# Patient Record
(demographics unavailable — no encounter records)

---

## 2017-07-02 NOTE — PDOC
History of Present Illness





- General


History Source: Patient, Old Records


Exam Limitations: No Limitations





- History of Present Illness


Initial Comments: 


07/02/17 09:51


The patient is a 59 year old male with a past medical history of COPD (on home 

O2 2L NC), asthma, CAD s/p PCI and stents s/p CABG in 2014, HTN, HLD who 

presents to the emergency department today with shortness of breath and left 

suprapubic pain for 2 days. The patient states that his pain was manageable 

when first presenting but has since become worsening. He notes that his pain is 

exacerbated by movement. He had one episode on non-bilious non-bloody vomit 

yesterday. 


 


PCP: Dr. Whitaker [phone]


 


PAST MEDICAL HISTORY:  COPD, asthma, CAD, HTN, HLD


PAST SURGICAL HISTORY:  PCI and stents s/p CABG in 2014, 


SOCIAL HISTORY: Former smoker (quit 2014). Denies EtOH use since 1979


ALLERGIES:  Penicillins, Seafood








<Andrea Chong - Last Filed: 07/02/17 12:24>





- General


History Source: Patient, Old Records


Exam Limitations: No Limitations





<Marcia Oakes - Last Filed: 07/02/17 14:04>





- General


Chief Complaint: Pain, Acute


Stated Complaint: PELVIC PAIN


Time Seen by Provider: 07/02/17 09:14





Past History





<Andrea Chong - Last Filed: 07/02/17 12:24>





- Past Medical History


Anemia: No


Asthma: No


Cancer: No


Cardiac Disorders: Yes (MI, stent, BYPASS)


CVA: No


COPD: Yes


CHF: Yes


Dementia: No


Diabetes: No


GI Disorders: No


 Disorders: No


HTN: Yes


Hypercholesterolemia: Yes


Liver Disease: No


Psychiatric Problems: Yes (anxiety)


Seizures: No


Thyroid Disease: No





- Surgical History


Abdominal Surgery: No


Appendectomy: No


Cardiac Surgery: Yes (Stent, cardiaccath)


Cholecystectomy: No


Lung Surgery: No


Neurologic Surgery: No


Orthopedic Surgery: Yes (KNEE)





- Immunization History


Immunization Up to Date: Yes





- Psycho/Social/Smoking Cessation Hx


Anxiety: No


Suicidal Ideation: No


Smoking Status: No


Smoking History: Former smoker


Have you smoked in the past 12 months: No


Number of Cigarettes Smoked Daily: 0


If you are a former smoker, when did you quit?: 2 years


Information on smoking cessation initiated: No


Hx Alcohol Use: No (1979)


Drug/Substance Use Hx: No


Substance Use Type: None


Hx Substance Use Treatment: No





<Marcia Oakes - Last Filed: 07/02/17 14:04>





- Past Medical History


Allergies/Adverse Reactions: 


 Allergies











Allergy/AdvReac Type Severity Reaction Status Date / Time


 


Penicillins Allergy Severe Rash Verified 02/28/17 15:32


 


seafood Allergy Severe Rash Uncoded 02/28/17 15:32











Home Medications: 


Ambulatory Orders





Albuterol 2.5/Ipratropium 0.5 [Duoneb -] 1 amp NEB QIDR  amp 11/30/16 


Budesonide/Formeterol Fumarate [SYMBICORT 80/4.5mcg -] 1 puff IH BID  inhaler 11 /30/16 


Clopidogrel Bisulfate [Plavix -] 75 mg PO DAILY  tablet 11/30/16 


Furosemide [Lasix -] 40 mg PO DAILY  tablet 11/30/16 


Prednisone [Deltasone -] 10 mg PO DAILY 02/28/17 


Clindamycin [Cleocin -] 300 mg PO TID #21 capsule 07/02/17 


Ramipril 5 mg PO DAILY 07/02/17 


Simvastatin 40 mg PO DAILY 07/02/17 











**Review of Systems





- Review of Systems


Able to Perform ROS?: Yes


Comments:: 


07/02/17 09:51


CONSTITUTIONAL: 


Absent: fever, chills, diaphoresis, generalized weakness, malaise, loss of 

appetite


HEENT: 


Absent: rhinorrhea, nasal congestion, throat pain, throat swelling, difficulty 

swallowing, mouth swelling, ear pain, eye pain, visual Changes


CARDIOVASCULAR: 


Absent: chest pain, syncope, palpitations, irregular heart rate, lightheadedness

, peripheral edema


RESPIRATORY: 


Present: Shortness of breath


Absent: cough, dyspnea with exertion, orthopnea, wheezing, stridor, hemoptysis


GASTROINTESTINAL:


Present: vomiting, left suprabupic pain 


Absent: abdominal distension, nausea, diarrhea, constipation, melena, 

hematochezia


GENITOURINARY: 


Absent: dysuria, frequency, urgency, hesitancy, hematuria, flank pain, genital 

pain


MUSCULOSKELETAL: 


Absent: myalgia, arthralgia, joint swelling


SKIN: 


Absent: rash, itching, pallor


HEMATOLOGIC/IMMUNOLOGIC: 


Absent: easy bleeding, easy bruising, lymphadenopathy, frequent infections


ENDOCRINE:


Absent: unexplained weight gain, unexplained weight loss, heat intolerance, 

cold intolerance


NEUROLOGIC: 


Absent: headache, focal weakness or paresthesias, dizziness, unsteady gait, 

seizure, mental status changes, bladder or bowel incontinence


PSYCHIATRIC: 


Absent: anxiety, depression, suicidal or homicidal ideation, hallucinations.








<Andrea Chong - Last Filed: 07/02/17 12:24>





*Physical Exam





- Vital Signs


 Last Vital Signs











Temp Pulse Resp BP Pulse Ox


 


 97.5 F L  109 H  22   125/87   99 


 


 07/02/17 08:50  07/02/17 08:50  07/02/17 08:50  07/02/17 08:50  07/02/17 08:50














- Physical Exam


Comments: 


07/02/17 09:51


GENERAL:


Well developed, well nourished. Awake and alert. In no acute distress.


HEENT:


Normocephalic, atraumatic. PERRLA, EOMI. No conjunctival pallor. Sclera are non-

icteric. Moist mucous membranes. Oropharynx is clear.


NECK: Supple. Full ROM. No JVD. Carotid pulses 2+ and symmetric, without 

bruits. No thyromegaly. No lymphadenopathy.


CARDIOVASCULAR:


Regular rate and rhythm. No murmurs, rubs, or gallops. Distal pulses are 2+ and 

symmetric. 


PULMONARY: 


(+) No evidence of respiratory distress. Lungs clear to auscultation 

bilaterally. No wheezing, rales or rhonchi. Breath sounds are discount but clear


ABDOMINAL:


(+) Soft. LLQ and left inguinal tenderness no inguinal hernias, penis is 

circumcised. Non-distended. No rebound or guarding. No organomegaly. 

Normoactive bowel sounds. 


MUSCULOSKELETAL 


Normal range of motion at all joints. No bony deformities or tenderness. No CVA 

tenderness.


EXTREMITIES: 


No cyanosis. No clubbing. No edema. No calf tenderness.


SKIN: Warm and dry. Normal capillary refill. No rashes. No jaundice. 


NEUROLOGICAL: Alert, awake, appropriate. Cranial nerves 2-12 intact. No 

deficits to light touch and temperature in face, upper extremities and lower 

extremities. No motor deficits in the in face, upper extremities and lower 

extremities. Normoreflexic in the upper and lower extremities. Normal speech. 

Toes are downgoing bilaterally. Gait is normal without ataxia.


PSYCHIATRIC: 


Cooperative. Good eye contact. Appropriate mood and affect.





<Andrea Chong - Last Filed: 07/02/17 12:24>





- Vital Signs


 Last Vital Signs











Temp Pulse Resp BP Pulse Ox


 


 97.5 F L  109 H  22   125/87   99 


 


 07/02/17 08:50  07/02/17 08:50  07/02/17 08:50  07/02/17 08:50  07/02/17 08:50














<Marcia Oakes - Last Filed: 07/02/17 14:04>





ED Treatment Course





- LABORATORY


CBC & Chemistry Diagram: 


 07/02/17 09:25





 07/02/17 09:25





- ADDITIONAL ORDERS


Additional order review: 


 Laboratory  Results











  07/02/17





  09:25


 


Phosphorus  Cancelled














- RADIOLOGY


Radiograph Interpretation: 


07/02/17 10:52


EXAM#: TYPE/EXAM: RESULT: [id number] CT/ABDOMEN PELVIS CT W/O CONTR ***ADDENDUM**

* ******** ADDENDUM #1 ******** Addendum: Centrilobular emphysema is seen 

involving the partially imaged lower chest.******** ORIGINAL REPORT ******** 

Abdomen and pelvis CT (without contrast) Clinical information: left lower 

quadrant pain Multiplanar imaging was performed. As requested no intravenous or 

oral contrast was administered. Diffuse colonic diverticulosis is noted without 

evidence of acute diverticulitis. Mild to moderate prostate enlargement. Small 

left inguinal hernia containing fat only. The appendix appears unremarkable. 

Colonic fecal retention which is probably moderate. No evidence of 

pneumoperitoneum, free intraperitoneal fluid or bowel obstruction. A right 

flank hernia is noted into which the descending colon bulges. No left-sided 

flank hernia is seen. There is equivocal subtle hepatic surface irregularity 

along the right lobe inferiorly. Clinical/ laboratory correlation is suggested 

in regards to possible cirrhosis. The spleen, pancreas, gallbladder, and 

adrenal glands and kidneys demonstrate no discrete noncontrast pathology. There 

is mild atherosclerotic dilatation of the infrarenal aorta. Somewhat prominent 

atherosclerotic aortic wall calcifications are noted. No definite 

lymphadenopathy is seen. Impression: No definite CT findings of acute pathology 

are identified. Colonic diverticulosis. Right flank hernia with the ascending 

colon bulging into the hernia defect. Small left inguinal hernia containing fat 

only. Equivocal subtle CT evidence of hepatic cirrhosis. atherosclerotic aortic 

wall calcifications are noted which may be somewhat more prominent than would 

be expected for the patient's chronologic age. Mild L1 superior endplate 

compression fracture which appears chronic. No bony retropulsion is seen. 

Reported By: Troy Avalos MD 07/02/17 1042 Abdomen and pelvis CT (without 

contrast) Clinical information: left lower quadrant pain Multiplanar imaging 

was performed. As requested no intravenous or oral contrast was administered. 

Diffuse colonic diverticulosis is noted without evidence of acute 

diverticulitis. Mild to moderate prostate enlargement. Small left inguinal 

hernia containing fat only. The appendix appears unremarkable. Colonic fecal 

retention which is probably moderate. No evidence of pneumoperitoneum, free 

intraperitoneal fluid or bowel obstruction. A right flank hernia is noted into 

which the descending colon bulges. No left-sided flank hernia is seen. There is 

equivocal subtle hepatic surface irregularity along the right lobe inferiorly. 

Clinical/ laboratory correlation is suggested in regards to possible cirrhosis. 

The spleen, pancreas, gallbladder, and adrenal glands and kidneys demonstrate 

no discrete noncontrast pathology. There is mild atherosclerotic dilatation of 

the infrarenal aorta. Somewhat prominent atherosclerotic aortic wall 

calcifications are noted. No definite lymphadenopathy is seen. Impression: No 

definite CT findings of acute pathology are identified. Colonic diverticulosis. 

Right flank hernia with the ascending colon bulging into the hernia defect. 

Small left inguinal hernia containing fat only. Equivocal subtle CT evidence of 

hepatic cirrhosis. atherosclerotic aortic wall calcifications are noted which 

may be somewhat more prominent than would be expected for the patient's 

chronologic age. Mild L1 superior endplate compression fracture which appears 

chronic. No bony retropulsion is seen. Reported By: Troy Avalos MD 07/02/ 17 1040 





- Medications


Given in the ED: 


ED Medications














Discontinued Medications














Generic Name Dose Route Start Last Admin





  Trade Name Ninoq  PRN Reason Stop Dose Admin


 


Ketorolac Tromethamine  30 mg 07/02/17 09:21 07/02/17 09:28





  Toradol Injection -  IVPUSH 07/02/17 09:22  30 mg





  ONCE ONE   Administration














<Andrea Chong - Last Filed: 07/02/17 12:24>





- LABORATORY


CBC & Chemistry Diagram: 


 07/02/17 09:25





 07/02/17 09:25





<Marcia Oakes - Last Filed: 07/02/17 14:04>





Medical Decision Making





- Medical Decision Making


07/02/17 09:23


59-year-old male with history of COPDon home O2 2 L nasal cannula, CHF, 

hypertension, coronary artery disease status post CABG presents the emergency 

Department with complaints of 2 day history of left lower quadrant pain 

radiating into the groin. Differential diagnosis includes but is not limited to

: Diverticulitis/diverticulosis, nephrolithiasis, UTI, muscular strain.


Plan:


1. Labs


2. Urine analysis


3. EKG


4. Pain management


5. Antiemetics


6. CT scan of abdomen and pelvis without contrast


7. Observe and reevaluate





07/02/17 14:00


Addendum: Labs are reviewed and are noted in the EMR. Urine analysis showed 

several red blood cells therefore CT scan of the abdomen and pelvis was 

performed which shows a small fat containing left inguinal hernia, diverticular 

disease without evidence of diverticulitis and no other acute intra-abdominal 

process. I have reevaluated the patient at this time and he continues to 

complain of left groin pain at this time. He appears to have inguinal 

lymphadenopathy that is present to palpation. The left lower extremity 

ultrasound to rule out DVT was obtained and was indeed negative for deep vein 

thrombosis. I have discussed the case with the patient's primary care physician 

and the plan is to discharge home, follow up with Dr. Suárez tomorrow and will 

discharge on clindamycin 300 mg 3 times a day for 7 days. I have also 

instructed the patient to return to the emergency department if his symptoms 

persist, worsen, or new symptoms arise.





<Marcia Oakes - Last Filed: 07/02/17 14:04>





*DC/Admit/Observation/Transfer





- Attestations


Scribe Attestion: 


07/02/17 09:52


Documentation prepared by Andrea Chong, acting as medical scribe for Marcia Oakes MD.





<Andrea Chong - Last Filed: 07/02/17 12:24>





- Discharge Dispostion


Admit: No





- Attestations


Physician Attestion: 





07/02/17 09:24


I, Dr. Marcia Oakes, attest that the scribes documentation that appears above 

has been prepared under my direction and personally reviewed by me in its 

entirety.





I confirmed that the note above accurately reflects all work, treatment, 

procedures, and medical decision-making performed by me.





<Marcia Oakes - Last Filed: 07/02/17 14:04>


Diagnosis at time of Disposition: 


 LLQ pain





- Discharge Dispostion


Disposition: HOME


Condition at time of disposition: Stable





- Prescriptions


Prescriptions: 


Clindamycin [Cleocin -] 300 mg PO TID #21 capsule





- Referrals


Referrals: 


Keanu Whitaker MD [Primary Care Provider] - 





- Patient Instructions


Printed Discharge Instructions:  Groin Hernia -- Adult


Additional Instructions: 


You are being discharged with a precription for an antibiotic called 

Clindamycin 300-take one tablet 3 times per day for one week for a presumed 

lymphadenitis.  Please follow-up with Dr. Suárez in the office tomorrow.  You may 

take tylenol as needed for pain.  Return to the ED if symptoms persist, worsen 

or new symptoms arise.

## 2017-07-02 NOTE — EKG
Test Reason : 

Blood Pressure : ***/*** mmHG

Vent. Rate : 106 BPM     Atrial Rate : 106 BPM

   P-R Int : 120 ms          QRS Dur : 130 ms

    QT Int : 354 ms       P-R-T Axes : 077 086 072 degrees

   QTc Int : 470 ms

 

SINUS TACHYCARDIA

RIGHT BUNDLE BRANCH BLOCK

ABNORMAL ECG

WHEN COMPARED WITH ECG OF 28-FEB-2017 16:17,

PREMATURE VENTRICULAR COMPLEXES ARE NO LONGER PRESENT

Confirmed by GERSON TIERNEY, TATA ([id number]) on 7/2/2017 5:23:58 PM

 

Referred By:             Confirmed By:TATA NIETO MD

## 2017-07-08 NOTE — PDOC
History of Present Illness





- General


Chief Complaint: SIRS, Suspected/Possible


Stated Complaint: PAIN, ACUTE


Time Seen by Provider: 07/08/17 16:50





- History of Present Illness


Initial Comments: 


07/08/17 17:52


Patient is a 59M with a PMH of CAD s/p stents and bypass, HLD and HTN here 

today complaining of left inguinal pain. He visited this emergency department 

on 7/2 with similar complaints. His white count was elevated to 16. His CT 

showed a non-incarcerated hernia in his right flank and left inguinal area, and 

a chronic compression fracture in L1. His UA was normal. He was discharged with 

clindamycin and told to follow up with his pcp.





He presented again to the ED because the pain in left inguinal pain. The pain 

radiates down his left leg and to his left back. He states that he had one 

episode of clear emesis. He endorses shortness of breath. He denies saddle 

anesthesia, bowel incontinence, problems urinating, pain in testicles, chest 

pain, fevers and chills. His last bowel movement was this morning and did not 

contain blood.











Past History





- Past Medical History


Allergies/Adverse Reactions: 


 Allergies











Allergy/AdvReac Type Severity Reaction Status Date / Time


 


Penicillins Allergy Severe Rash Verified 07/08/17 16:34


 


seafood Allergy Severe Rash Uncoded 07/08/17 16:34











Home Medications: 


Ambulatory Orders





Albuterol 2.5/Ipratropium 0.5 [Duoneb -] 1 amp NEB QIDR  amp 11/30/16 


Budesonide/Formeterol Fumarate [SYMBICORT 80/4.5mcg -] 1 puff IH BID  inhaler 11 /30/16 


Clopidogrel Bisulfate [Plavix -] 75 mg PO DAILY  tablet 11/30/16 


Furosemide [Lasix -] 40 mg PO DAILY  tablet 11/30/16 


Prednisone [Deltasone -] 10 mg PO DAILY 02/28/17 


Clindamycin [Cleocin -] 300 mg PO TID #21 capsule 07/02/17 


Oxycodone HCl/Acetaminophen [Percocet 5-325 mg Tablet] 1 tab PO Q4H #10 tablet 

MDD 4 07/02/17 


Ramipril 5 mg PO DAILY 07/02/17 


Simvastatin 40 mg PO DAILY 07/02/17 








Anemia: No


Asthma: No


Cancer: No


Cardiac Disorders: Yes (MI, stent, BYPASS)


CVA: No


COPD: Yes


CHF: Yes


Dementia: No


Diabetes: No


GI Disorders: No


 Disorders: No


HTN: Yes


Hypercholesterolemia: Yes


Liver Disease: No


Psychiatric Problems: Yes (anxiety)


Seizures: No


Thyroid Disease: No


Other medical history: o2 prn





- Surgical History


Abdominal Surgery: No


Appendectomy: No


Cardiac Surgery: Yes (Stent, cardiaccath)


Cholecystectomy: No


Lung Surgery: No


Neurologic Surgery: No


Orthopedic Surgery: Yes (KNEE)





- Immunization History


Immunization Up to Date: Yes





- Psycho/Social/Smoking Cessation Hx


Anxiety: No


Suicidal Ideation: No


Smoking Status: No


Smoking History: Former smoker


Have you smoked in the past 12 months: No


Number of Cigarettes Smoked Daily: 0


If you are a former smoker, when did you quit?: 2 years


Information on smoking cessation initiated: No


Hx Alcohol Use: No


Drug/Substance Use Hx: No


Substance Use Type: None


Hx Substance Use Treatment: No





Abd/GI Specific PMHX





- Complaint Specific PMHX


GERD: No


GI Ulcer Disease: No





**Review of Systems





- Review of Systems


Constitutional: No: Chills, Fever


HEENTM: No: Recent change in vision


Respiratory: Yes: Shortness of Breath


Cardiac (ROS): No: Chest Pain, Palpitations


ABD/GI: Yes: Nausea, Vomiting.  No: Constipated, Diarrhea


: No: Dysuria, Frequency, Incontinence


Musculoskeletal: Yes: Back Pain, Joint Pain, Muscle Pain


Integumentary: No: Rash


Neurological: No: Headache





*Physical Exam





- Vital Signs


 Last Vital Signs











Temp Pulse Resp BP Pulse Ox


 


 98.1 F   132 H  20   118/65   93 L


 


 07/08/17 16:35  07/08/17 16:35  07/08/17 16:35  07/08/17 16:35  07/08/17 16:35














- Physical Exam


Comments: 





07/08/17 18:25


Constitutional: Well nourished, overweight, in moderate distress


Head: Normocephalic, atraumatic


HEENT: External inspection is normal, loss of front right tooth


CV: Tachycardic, regular rhythm, no murmurs, rubs or gallops


Resp: Normal work of breathing, clear to auscultation bilaterally


Abd: Tender in lower left quadrant, no palpable organomegaly or pulsatile masses

, no palpable hernias. Normal bowel sounds. 


: Normal external genitalia, no inguinal hernia


Rectal: Normal rectal tone, no mei blood or masses


Ext: 2+ pulses in lower extremities, warm


Neuro: No focal neuro deficits, 2+ DTRs in both knees, CN2-12 intact 





ED Treatment Course





- LABORATORY


CBC & Chemistry Diagram: 


 07/08/17 17:43





 07/08/17 17:43





Medical Decision Making





- Medical Decision Making


07/08/17 18:31


Patient is a 59 year old male with history of CAD s/p stents and bypass, and 

COPD here today complaining of inguinal pain. Tachycardic with ECG showing 

RBBB. RBBB is unchanged from ECG on 2/20/17. No new changes on his ECG. 

Differential diagnosis for the left inguinal pain includes, but is not limited 

to: musculoskeletal pain, radiculopathy, incarcerated hernia, diverticulitis, 

UTI. Believe that radiculopathy is most likely given prior re-assuring CT scans 

on 7/2, normal UA on 7/2 and physical exam. Due to persistent tachycardia, will 

get D-dimer to attempt to rule out PE. CMP, CBC and UA also ordered. Prior CT 

showed chronic fracture of L1.





07/08/17 20:07


D-dimer >5000. CTA ordered to rule out PE.





07/08/17 21:15


Hip and pelvis x-ray reviewed and show no fracture or dislocation. CTA negative 

for PE. HR down from 130 to 104 after fluids.





*DC/Admit/Observation/Transfer


Diagnosis at time of Disposition: 


 Intractable pain, Sinus tachycardia





Compression fracture of first lumbar vertebra


Qualifiers:


 Encounter type: initial encounter Fracture type: closed Qualified Code(s): 

S32.010A - Wedge compression fracture of first lumbar vertebra, initial 

encounter for closed fracture





Radiculopathy


Qualifiers:


 Spinal region: lumbar Qualified Code(s): M54.16 - Radiculopathy, lumbar region





- Discharge Dispostion


Condition at time of disposition: Fair


Admit: Yes





- Referrals


Referrals: 


Keanu Whitaker MD [Primary Care Provider] -

## 2017-07-08 NOTE — PDOC
Attending Attestation





- Resident


Resident Name: Art Perez





- ED Attending Attestation


I have performed the following: I have examined & evaluated the patient, The 

case was reviewed & discussed with the resident, I agree w/resident's findings 

& plan, Exceptions are as noted





- HPI


HPI: 





07/08/17 21:10


59-year-old male with multiple comorbidities, presents to the ER with 

atraumatic left sided low back pain, left hip pain and left leg pain for the 

past several days that is not effectively controlled by mouth Percocet, and is 

increased in severity. Patient been seen and evaluated in this ER previously 

and underwent a CT of abdomen and pelvis which revealed a compression fracture 

of the superior endplate of L1, a left inguinal hernia containing fat only, but 

no evidence of nephrolithiasis or other acute intra-abdominal pathology.





- Physicial Exam


PE: 





07/08/17 21:14


In the ER, patient is awake and alert, tachycardic, afebrile, in mild distress. 

Physical exam reveals reproducible left lower back paraspinal tenderness to 

palpation, pain with internal/external rotation of left hip as well as pain 

along the proximal anterior aspect of the left thigh. Patient is 

neurovascularly intact distally with intact DTRs and good rectal tone as per 

Dr. Farias's evaluation.





- Medical Decision Making





07/08/17 21:15


Patient is a 59-year-old male with multiple comorbidities who presents with 

signs and symptoms of likely L1 radiculopathy that may be related to the 

compression fracture identified earlier on the CT. There is no evidence of 

cauda equina or conus medullaris at this time. Left hip x-ray reveals no 

evidence of significant DJD or avascular necrosis of the left hip. Patient's 

EKG is unchanged from previous and reveals underlying RBBB; d-dimer was 

elevated and CTA was obtained. There is no evidence of pulmonary embolism at 

this time. Will discuss with PMD. Will reassess.

## 2017-07-09 NOTE — PN
Progress Note (short form)





- Note


Progress Note: 


NEUROSURGERY





H/O CAD s/p CABG and stent, HTN, COPD, hypercholesterolemia reportedly with 

back pain and L hip/thigh pain for about 10l days duration; no reported trauma


Chart reviewed 


CT scans of chest (7-7) and Abd (7-2) reviewed relevant to spine pathology


L1 mild superior endplate depression of approximately 10-15% centrally with 

mild superior endplate retropulsion but no canal compromise; osteopenia


Does not appear to be a neurosurgical candidate given relatively mild CT scan 

pathology of spine


Medical management for osteoporosis


Consider pain management input for pain (pain management injection vs 

vertebroplasty if persistent pain)


DVT prophylaxis


Full consult to follow

## 2017-07-09 NOTE — HP
Admitting History and Physical





- Primary Care Physician


PCP: Keanu Whitaker





- Admission


Chief Complaint: Left lumbar pain Radiates to Left LE


History of Present Illness: 


59 yrs old man multiple medical co-morbidities H/O HTN, COPD Emphysema GOLD 

stage 4 on Home O2 CAD chronic back pain, patient has been having lower back 

pain  for past few wks fort that he visited ED last wk work up shows  

compression fracture of LA Endplate, patient was discharged home on Po pain 

medication, remained symptomatic, now pain aggravated to 10/10 radiates to Left 

Hip, LE like electrical sensations, unable to sleep die to pain denies any 

sensory loss in saddle area or bowel bl;adder incontinence, considering L1 

compression fracture, intractable pain and co-morbidities admitted for 

inpatient pain management and evaluation, denies any fever, chills, nausea, 

vomiting, dysuria or diarrhea, at base line ET limited. On Home O2. 





- Past Medical History


Cardiovascular: Yes: CAD, HTN, Hyperlipdemia


Pulmonary: Yes: COPD





- Past Surgical History


Past Surgical History: Yes: CABG, Stent





- Smoking History


Smoking history: Former smoker


Have you smoked in the past 12 months: No


Aproximately how many cigarettes per day: 0


If you are a former smoker, when did you quit?: 2 years





- Alcohol/Substance Use


Hx Alcohol Use: No





Home Medications





- Allergies


Allergies/Adverse Reactions: 


 Allergies











Allergy/AdvReac Type Severity Reaction Status Date / Time


 


Penicillins Allergy Severe Rash Verified 07/08/17 16:34


 


seafood Allergy Severe Rash Uncoded 07/08/17 16:34














- Home Medications


Home Medications: 


Ambulatory Orders





Albuterol 2.5/Ipratropium 0.5 [Duoneb -] 1 amp NEB QIDR  amp 11/30/16 


Budesonide/Formeterol Fumarate [SYMBICORT 80/4.5mcg -] 1 puff IH BID  inhaler 11 /30/16 


Clopidogrel Bisulfate [Plavix -] 75 mg PO DAILY  tablet 11/30/16 


Furosemide [Lasix -] 40 mg PO DAILY  tablet 11/30/16 


Prednisone [Deltasone -] 10 mg PO DAILY 02/28/17 


Clindamycin [Cleocin -] 300 mg PO TID #21 capsule 07/02/17 


Oxycodone HCl/Acetaminophen [Percocet 5-325 mg Tablet] 1 tab PO Q4H #10 tablet 

MDD 4 07/02/17 


Ramipril 5 mg PO DAILY 07/02/17 


Simvastatin 40 mg PO DAILY 07/02/17 











Family Disease History





- Family Disease History


Family Disease History: Heart Disease: Grandparent, Other: Brother (Lupus)





Review of Systems





- Review of Systems


Constitutional: denies: Chills, Diaphoresis, Fever, Unintentional Wgt. Loss


HENT: reports: No Symptoms.  denies: Difficult Swallowing, Ear Discharge


Neck: reports: No Symptoms.  denies: Decreased ROM, Lumps, Pain on Movement


Cardiovascular: reports: No Symptoms.  denies: Chest Pain, Edema, Palpitations


Respiratory: reports: Exercise Intolerance, Orthopnea, SOB on Exertion


Gastrointestinal: denies: Abdominal Pain, Bloating, Constipation


Breasts: reports: No Symptoms Reported


Musculoskeletal: reports: Back Pain, Decreased ROM, Extremity Pain, Muscle 

Pain.  denies: Joint Swelling


Neurological: reports: No Symptoms.  denies: Change in LOC, Change in Speech, 

Confusion


Endocrine: reports: No Symptoms.  denies: Excessive Sweating, Flushing


Hematology/Lymphatic: reports: No Symptoms.  denies: Easily Bruised, Excessive 

Bleeding


Psychiatric: reports: No Symptoms


Pain Intensity: 10





Physical Examination


Vital Signs: 


 Vital Signs











Temperature  98.6 F   07/09/17 09:37


 


Pulse Rate  99 H  07/09/17 11:33


 


Respiratory Rate  18   07/09/17 09:37


 


Blood Pressure  114/67   07/09/17 09:37


 


O2 Sat by Pulse Oximetry (%)  100   07/09/17 11:33











P Exam:





Middle aged man looks elder to his age c/o sever Back pain





HEENT: Mm moist, no anemia, PERRLA, EOMI





NECK: No JVd, No Bruit, Normal thyroid





CHEST: B/L Distant sound , Crepts +





CVS: s1S2 R no m/g/R





ABD: No distention, mild inguinal hermit, no Distention, Non tender Bs +





EXT: Tenderness in Lumbar area,  No edema feet, No calf Tenderness, Pulses +





CNS:


AOX3


Normal speech


Normal Cranil N 2-12


Noral sensation;


Motor Exam Limited due to pain no gross weakness


Reflex +


Cerbellar Not tested due tp pain.





Labs: 


 CBC, BMP





 07/09/17 06:05 





 07/09/17 06:05 





 Laboratory Results - last 24 hr











  07/08/17 07/08/17 07/08/17





  17:43 17:43 18:11


 


WBC  15.2 H  


 


RBC  4.38  


 


Hgb  10.9 L  


 


Hct  34.7 L  


 


MCV  79.1 L  


 


MCH  25.0 L  


 


MCHC  31.6 L  


 


RDW  15.4  


 


Plt Count  510 H  


 


MPV  7.3 L  


 


Neutrophils %  85.2 H  


 


Lymphocytes %  8.9  D  


 


Monocytes %  5.6  


 


Eosinophils %  0.1  D  


 


Basophils %  0.2  


 


D-Dimer   


 


Sodium   135 L 


 


Potassium   4.5 


 


Chloride   96 L 


 


Carbon Dioxide   32 


 


Anion Gap   7 L 


 


BUN   19 H D 


 


Creatinine   1.0  D 


 


Creat Clearance w eGFR   > 60 


 


Random Glucose   125 H 


 


Hemoglobin A1c %   


 


Calcium   8.8 


 


Total Bilirubin   0.6 


 


AST   18  D 


 


ALT   19  D 


 


Alkaline Phosphatase   178 H 


 


Total Protein   7.6 


 


Albumin   3.2 L 


 


Urine Color    Yellow


 


Urine Appearance    Clear


 


Urine pH    5.0


 


Ur Specific Gravity    1.020


 


Urine Protein    Negative


 


Urine Glucose (UA)    Negative


 


Urine Ketones    Trace H


 


Urine Blood    Negative


 


Urine Nitrite    Negative


 


Urine Bilirubin    Negative


 


Urine Urobilinogen    2.0 e.u/dl


 


Ur Leukocyte Esterase    Negative














  07/08/17 07/09/17 07/09/17





  18:11 06:05 06:05


 


WBC   8.8  D 


 


RBC   4.05 


 


Hgb   10.4 L 


 


Hct   32.0 L 


 


MCV   79.0 L 


 


MCH   25.7 


 


MCHC   32.6 


 


RDW   15.4 


 


Plt Count   409 


 


MPV   7.0 L 


 


Neutrophils %   68.3 


 


Lymphocytes %   19.9  D 


 


Monocytes %   9.3 


 


Eosinophils %   1.9  D 


 


Basophils %   0.6 


 


D-Dimer  > 5000 H  


 


Sodium    138


 


Potassium    4.7


 


Chloride    100


 


Carbon Dioxide    33 H


 


Anion Gap    5 L


 


BUN    17


 


Creatinine    0.8


 


Creat Clearance w eGFR    > 60


 


Random Glucose    77  D


 


Hemoglobin A1c %   


 


Calcium    8.6


 


Total Bilirubin    0.6


 


AST    17


 


ALT    16


 


Alkaline Phosphatase    149 H


 


Total Protein    6.3 L


 


Albumin    2.6 L


 


Urine Color   


 


Urine Appearance   


 


Urine pH   


 


Ur Specific Gravity   


 


Urine Protein   


 


Urine Glucose (UA)   


 


Urine Ketones   


 


Urine Blood   


 


Urine Nitrite   


 


Urine Bilirubin   


 


Urine Urobilinogen   


 


Ur Leukocyte Esterase   

















Imaging





- Results


Chest X-ray: Report Reviewed (COPD)


X-ray: Report Reviewed (Rt Hip and Pelvis chronic JDJ)


Cat Scan: Report Reviewed (Abdomen 7/2/2017; No Renal calculi L1 Endplate 

compression fracture)


EKG: Report Reviewed (Sinus Tcahycardia)





Problem List





- Problems


(1) Compression fracture of L1 lumbar vertebra


Assessment/Plan: 


Patient present L1 radiculopathy with Ct shows L1 endplate compression fracture

, no clinical sign of cord compression, Pain control, Neurosurgery consult, F/U 

MRI Lumbosacral spine.


Code(s): S32.010A - WEDGE COMPRESSION FRACTURE OF FIRST LUMBAR VERTEBRA, INIT   

Qualifiers: 


     Encounter type: initial encounter     Fracture type: closed        

Qualified Code(s): S32.010A - Wedge compression fracture of first lumbar 

vertebra, initial encounter for closed fracture  





(2) Acute on chronic respiratory failure with hypoxia and hypercapnia


Assessment/Plan: 


Cont all home medication atient is on Po prednisone


Code(s): J96.21 - ACUTE AND CHRONIC RESPIRATORY FAILURE WITH HYPOXIA


J96.22 - ACUTE AND CHRONIC RESPIRATORY FAILURE WITH HYPERCAPNIA





(3) Diastolic CHF


Assessment/Plan: 


compeated cont Lasix


Code(s): I50.30 - UNSPECIFIED DIASTOLIC (CONGESTIVE) HEART FAILURE   Qualifiers

: 


     Congestive heart failure chronicity: chronic        Qualified Code(s): 

I50.32 - Chronic diastolic (congestive) heart failure  





(4) CAD (coronary artery disease)


Assessment/Plan: 


S/P CABG no active issue cont all home medications.


Code(s): I25.10 - ATHSCL HEART DISEASE OF NATIVE CORONARY ARTERY W/O ANG PCTRS 

  Qualifiers: 


     Coronary Disease-Associated Artery/Lesion type: native artery     Native 

vs. transplanted heart: native heart     Associated angina: without angina     

   Qualified Code(s): I25.10 - Atherosclerotic heart disease of native coronary 

artery without angina pectoris  





(5) Hypercholesterolemia


Assessment/Plan: 


Cont Statin F/U Lipid panel


Code(s): E78.0 - PURE HYPERCHOLESTEROLEMIA * DO NOT USE *





(6) HTN (hypertension)


Assessment/Plan: 


Well controlled cont all home meds.


Code(s): I10 - ESSENTIAL (PRIMARY) HYPERTENSION   Qualifiers: 


     Hypertension type: essential hypertension        Qualified Code(s): I10 - 

Essential (primary) hypertension  





(7) Inguinal hernia


Assessment/Plan: 


Left sided Non obstructed as per Ct only fatty tissue


Code(s): K40.90 - UNIL INGUINAL HERNIA, W/O OBST OR GANGR, NOT SPCF AS RECUR

## 2017-07-10 NOTE — CONSULT
Consult


Consult Specialty:: General Surgery


Referred by:: Dr. Whitaker


Reason for Consultation:: possible left inguinal hernia





- History of Present Illness


Chief Complaint: left groin pain


History of Present Illness: 


58yo M with multiple medical problems including CAD s/p CABG and stents on 

plavix, COPD on home O2 and steroids, chronic back pain, complains of severe 

left groin pain for at least a week with radiation to the testicle, LLQ, as 

well as left sacroiliac region and gluteal pain. It hurts when he moves, is not 

associated with urinary or bowel symptoms, no nausea or vomiting. He has seen 

his PMD recently, who thought he had possibly a chronically incarcerated 

inguinal hernia, and has seen a surgeon as an outpatient, who told him he did 

not appreciate a hernia, and he should go to the ER if the pain got worse. He 

was in the ER 7/2 and had an abd/pelvis CT showing small LIH with fat only and 

chronic L1 endplate compression fx. He returned to ER yesterday and was 

admitted with intractable pain. Has been seen by neurosurgery and has MRI 

pending, but per NS, CT changes are too mild for surgery. Surgery is consulted 

to evaluate for left inguinal hernia.





- History Source


History Provided By: Patient, Medical Record, Caregiver (Dr. Whitaker)


Limitations to Obtaining History: No Limitations





- Past Medical History


Cardio/Vascular: Yes: CAD, HTN, Hyperlipdemia


Pulmonary: Yes: COPD, O2 Dependent


Musculoskeletal: Yes: Chronic low back pain





- Past Surgical History


Past Surgical History: Yes: CABG, Stent





- Alcohol/Substance Use


Hx Alcohol Use: No (quit 1979)


History of Substance Use: reports: None





- Smoking History


Smoking history: Former smoker


Have you smoked in the past 12 months: No


Aproximately how many cigarettes per day: 0 (1.5ppd x many years)


If you are a former smoker, when did you quit?: 2 years





Home Medications





- Allergies


Allergies/Adverse Reactions: 


 Allergies











Allergy/AdvReac Type Severity Reaction Status Date / Time


 


Penicillins Allergy Severe Rash Verified 07/08/17 16:34


 


seafood Allergy Severe Rash Uncoded 07/08/17 16:34














- Home Medications


Home Medications: 


Ambulatory Orders





Albuterol 2.5/Ipratropium 0.5 [Duoneb -] 1 amp NEB QIDR  amp 11/30/16 


Budesonide/Formeterol Fumarate [SYMBICORT 80/4.5mcg -] 1 puff IH BID  inhaler 11 /30/16 


Clopidogrel Bisulfate [Plavix -] 75 mg PO DAILY  tablet 11/30/16 


Furosemide [Lasix -] 40 mg PO DAILY  tablet 11/30/16 


Prednisone [Deltasone -] 10 mg PO DAILY 02/28/17 


Clindamycin [Cleocin -] 300 mg PO TID #21 capsule 07/02/17 


Oxycodone HCl/Acetaminophen [Percocet 5-325 mg Tablet] 1 tab PO Q4H #10 tablet 

MDD 4 07/02/17 


Ramipril 5 mg PO DAILY 07/02/17 


Simvastatin 40 mg PO DAILY 07/02/17 











Family Disease History





- Family Disease History


Family Disease History: Heart Disease: Grandparent, Other: Brother (Lupus)





Review of Systems





- Review of Systems


Cardiovascular: denies: Chest Pain, Palpitations


Respiratory: reports: Exercise Intolerance, SOB on Exertion.  denies: Cough


Gastrointestinal: reports: Abdominal Pain (LLQ/left groin)


Genitourinary: reports: Testicular Pain (left - from groin).  denies: Burning, 

Dysuria


Musculoskeletal: reports: Back Pain, Extremity Pain (back of left knee, pain 

radiating down leg), Joint Pain (posterior left hip - over SI region and gluteus

, paraspinal)


Integumentary: denies: Bruising, Rash





Physical Exam


Vital Signs: 


 Vital Signs











Temperature  97.8 F   07/10/17 16:47


 


Pulse Rate  105 H  07/10/17 16:47


 


Respiratory Rate  20   07/10/17 16:47


 


Blood Pressure  122/69   07/10/17 16:47


 


O2 Sat by Pulse Oximetry (%)  96   07/10/17 11:24











Constitutional: Yes: Well Nourished, Anxious, Moderate Distress


Eyes: Yes: Conjunctiva Clear, EOM Intact


HENT: Yes: Atraumatic, Normocephalic


Neck: Yes: Supple, Trachea Midline


Cardiovascular: Yes: Regular Rate and Rhythm, Tachycardia (mild)


Respiratory: Yes: Regular, On Nasal O2 (2L), Rhonchi (few scattered), SOB on 

Exertion


Gastrointestinal: Yes: Normal Bowel Sounds, Soft, Hernia (possible small 

impulse at left inguinal ring - extremely uncomfortable with palpation of 

bilateral inguinal canals, L>R; left cord itself nontender, left testicle 

itself nontender, groin area and canal very tender).  No: Distention, 

Tenderness (except as exam nears left groin)


...Rectal Exam: Yes: Deferred


Renal/: No: CVA Tenderness - Left (tender lower down over left sacroiliac 

area and gluteus), Scrotal Edema


Musculoskeletal: Yes: Back Pain, Muscle Pain


Extremities: No: Cool, Cyanosis


Edema: No


Peripheral Pulses WNL: Yes


Integumentary: No: Jaundice, Rash


Neurological: Yes: Alert, Oriented


Psychiatric: Yes: Alert, Oriented


Labs: 


 CBC, BMP





 07/10/17 06:10 





 07/10/17 06:10 











Imaging





- Results


X-ray: Report Reviewed (no acute pathology left hip/pelvis)


Cat Scan: Report Reviewed (L1 superior endplate and vertebral body chronic 

compression deformities), Image Reviewed (R flank hernia with ascending colon (

asymptomatic), small left fat-containing inguinal hernia)


Ultrasound: Report Reviewed (no DVT)


MRI: Pending (lumbosacral spine)





Problem List





- Problems


(1) Left groin pain


Assessment/Plan: 


incidental finding of right flank hernia - asymptomatic - would not treat


small fat-containing left inguinal hernia on CT


difficult to appreciate hernia on clinical exam


pt's pain pattern, discomfort with exam of bilateral inguinal rings and 

posterior left pain is not consistent with LIH being the primary source of his 

discomfort


L1 compression fracture, even if chronic, could be related


MRI pending


poor surgical candidate secondary to comorbidities - would not repair LIH at 

this time


would treat pending MRI results for pain as per neurosurgery


consider PT consult, possible orthotics for lower back brace?





Thank you for the opportunity to participate in the care of this patient


Code(s): R10.32 - LEFT LOWER QUADRANT PAIN





(2) Compression fracture of L1 lumbar vertebra


Assessment/Plan: 


neurosurgery consulted


MRI pending


could be related to pain radiating to left posterior and anterior regions, 

including groin


Code(s): S32.010A - WEDGE COMPRESSION FRACTURE OF FIRST LUMBAR VERTEBRA, INIT   

Qualifiers: 


     Encounter type: initial encounter     Fracture type: closed        

Qualified Code(s): S32.010A - Wedge compression fracture of first lumbar 

vertebra, initial encounter for closed fracture  





(3) CAD (coronary artery disease)


Code(s): I25.10 - ATHSCL HEART DISEASE OF NATIVE CORONARY ARTERY W/O ANG PCTRS 

  Qualifiers: 


     Coronary Disease-Associated Artery/Lesion type: native artery     Native 

vs. transplanted heart: native heart     Associated angina: without angina     

   Qualified Code(s): I25.10 - Atherosclerotic heart disease of native coronary 

artery without angina pectoris  





(4) COPD (chronic obstructive pulmonary disease)


Assessment/Plan: 


on home O2, 2L NC - states he doesn't use it all the time


severe emphysematous changes with bullae bilaterally on CT


Code(s): J44.9 - CHRONIC OBSTRUCTIVE PULMONARY DISEASE, UNSPECIFIED   Qualifiers

: 


     COPD type: emphysema     Emphysema type: unspecified        Qualified Code(

s): J43.9 - Emphysema, unspecified  





(5) Diastolic CHF


Code(s): I50.30 - UNSPECIFIED DIASTOLIC (CONGESTIVE) HEART FAILURE   Qualifiers

: 


     Congestive heart failure chronicity: chronic        Qualified Code(s): 

I50.32 - Chronic diastolic (congestive) heart failure  





(6) HTN (hypertension)


Code(s): I10 - ESSENTIAL (PRIMARY) HYPERTENSION   Qualifiers: 


     Hypertension type: essential hypertension        Qualified Code(s): I10 - 

Essential (primary) hypertension

## 2017-07-10 NOTE — EKG
Test Reason : 

Blood Pressure : ***/*** mmHG

Vent. Rate : 128 BPM     Atrial Rate : 128 BPM

   P-R Int : 126 ms          QRS Dur : 128 ms

    QT Int : 310 ms       P-R-T Axes : [phone] degrees

   QTc Int : 452 ms

 

SINUS TACHYCARDIA

POSSIBLE LEFT ATRIAL ENLARGEMENT

RIGHT BUNDLE BRANCH BLOCK

ABNORMAL ECG

WHEN COMPARED WITH ECG OF 02-JUL-2017 10:56,

NO SIGNIFICANT CHANGE WAS FOUND

Confirmed by PETR SALINAS MD ([id number]) on 7/10/2017 1:50:37 PM

 

Referred By:             Confirmed By:PETR SALINAS MD

## 2017-07-10 NOTE — PN
Progress Note (short form)





- Note


Progress Note: 


NEUROSURGERY CONSULT DICTATED





H/O CAD s/p CABG and stent, HTN, COPD, RA, hypercholesterolemia reportedly with 

back pain and L hip/thigh pain for about 10l days duration; no reported trauma.

  Has been on steroids frequently


Chart reviewed 


PE: Geneal- occ B wheeze; Neuro- L IP/Quad 3-4- pain limited; Sensation- mild 

numbness L anrterio thigh


CT scans of chest (7-7) and Abd (7-2): L1 mild superior endplate depression of 

approximately 10-15% centrally with mild superior endplate retropulsion but no 

canal compromise; osteopenia


Not a neurosurgical candidate given relatively mild CT scan pathology of spine


Medical management for osteoporosis


Added gabapentin


Tx d/w pt


Consider pain management input if persistent pain


DVT prophylaxis

## 2017-07-10 NOTE — PN
Progress Note, Physician


Chief Complaint: 


Lt lower quadrant pain persists


History of Present Illness: 


Admitted with Lt lower quadrent pain


No real positive findings





- Current Medication List


Current Medications: 


Active Medications





Acetaminophen (Tylenol -)  325 mg PO Q4H PRN


   PRN Reason: PAIN


   Stop: 07/12/17 00:07


   Last Admin: 07/10/17 11:25 Dose:  325 mg


Albuterol/Ipratropium (Duoneb -)  1 amp NEB QIDR [facility]


   Last Admin: 07/10/17 18:49 Dose:  1 amp


Alprazolam (Xanax -)  0.5 mg PO Q8H PRN


   PRN Reason: ANXIETY


   Last Admin: 07/10/17 11:24 Dose:  0.5 mg


Atorvastatin Calcium (Lipitor -)  20 mg PO HS [facility]


   Last Admin: 07/09/17 21:21 Dose:  20 mg


Budesonide/Formoterol Fumarate (Symbicort 80/4.5mcg -)  1 puff IH BID [facility]


   Last Admin: 07/10/17 11:26 Dose:  1 puff


Clopidogrel Bisulfate (Plavix -)  75 mg PO DAILY [facility]


   Last Admin: 07/10/17 11:25 Dose:  75 mg


Enoxaparin Sodium (Lovenox -)  40 mg SQ DAILY [facility]


   Last Admin: 07/10/17 11:26 Dose:  40 mg


Furosemide (Lasix -)  40 mg PO DAILY [facility]


   Last Admin: 07/10/17 11:23 Dose:  40 mg


Gabapentin (Neurontin -)  100 mg PO TID [facility]


   Last Admin: 07/10/17 15:28 Dose:  100 mg


Hydromorphone HCl (Dilaudid Injection -)  1 mg IVPB Q6H PRN


   PRN Reason: PAIN


Lidocaine (Lidoderm Patch -)  1 patch TP DAILY [facility]


   Last Admin: 07/10/17 11:23 Dose:  1 patch


Miscellaneous (Lidoderm Patch Removal)  1 each MC DAILY@2200 [facility]


   Last Admin: 07/09/17 21:22 Dose:  1 each


Ondansetron HCl (Zofran Injection)  4 mg IVPB Q6H PRN


   PRN Reason: NAUSEA


Oxycodone HCl (Roxicodone -)  5 mg PO Q4H PRN


   PRN Reason: PAIN


   Last Admin: 07/10/17 11:25 Dose:  5 mg


Prednisone (Deltasone -)  10 mg PO DAILY [facility]


   Last Admin: 07/10/17 11:25 Dose:  10 mg


Ramipril (Altace -)  5 mg PO DAILY [facility]


   Last Admin: 07/10/17 11:25 Dose:  5 mg











- Objective


Vital Signs: 


 Vital Signs











Temperature  97.8 F   07/10/17 16:47


 


Pulse Rate  105 H  07/10/17 16:47


 


Respiratory Rate  20   07/10/17 16:47


 


Blood Pressure  122/69   07/10/17 16:47


 


O2 Sat by Pulse Oximetry (%)  96   07/10/17 11:24











Constitutional: Yes: Mild Distress


Eyes: Yes: WNL


HENT: Yes: WNL


Neck: Yes: WNL


Cardiovascular: Yes: WNL


Respiratory: Yes: WNL


Gastrointestinal: Yes: WNL


...Rectal Exam: Yes: Deferred


Genitourinary: Yes: WNL


Edema: No


Psychiatric: Yes: WNL


Labs: 


 CBC, BMP





 07/10/17 06:10 





 07/10/17 06:10 











Assessment/Plan


case examined with Dr Ojeda 


At present advised pain management

## 2017-07-11 NOTE — CONS
DATE OF CONSULTATION:  07/10/2017

 

REQUESTING PHYSICIAN:  Keanu Whitaker MD

 

CONSULTING PHYSICIAN:  Art Prakash MD, Neurosurgery.

 

CHIEF COMPLAINT:  Left-sided back pain, hip pain, and left knee pain.

 

HISTORY OF PRESENT ILLNESS:  The patient is a 59-year-old, right-handed male 
with a

history of coronary artery disease status post CABG and stent placement,

hypertension, COPD, rheumatoid arthritis, and hypercholesterolemia, with 
complaint of

left-sided lower back and left-sided leg pain of 10 days' duration.  He stated 
that

he did not sustain any recent fall or trauma.  He had been coughing quite a

lot because of his COPD.  He has not done so recently.  He denied any fevers or

chills or any recent infection.

 

PAST MEDICAL HISTORY:  Significant for coronary artery disease, hypertension, 
COPD,

rheumatoid arthritis, hypercholesterolemia.

 

CURRENT MEDICATIONS:  Include Lidoderm patch, Zofran, prednisone, Tylenol, 
Altace,

Lovenox, Xanax, DuoNeb, Lipitor, Lasix, Roxicodone, Plavix, and Dilaudid p.r.n.

 

ALLERGIES:  There are allergies to PENICILLIN and SEAFOOD.

 

SOCIAL HISTORY:  He used to smoke up to 2014, when he quit.  He drinks alcohol

socially.  He is not working.

 

REVIEW OF SYSTEMS:  Otherwise negative for major cardiovascular, pulmonary,

gastrointestinal, genitourinary, endocrinological, neurological, psychological

problems except for the above.

 

PHYSICAL EXAMINATION:

Vital Signs:  Temperature is 98.4, blood pressure is 120/72 with pulse rate of 
100. 

O2 saturation is 96% on 2 L.

HEENT:  Examination shows him to be normocephalic, atraumatic, anicteric.

Neck: Supple with no carotid bruit. 

Coronary:  Examination demonstrated a regular rhythm. 

Lungs:  Clear bilaterally. 

Abdomen:  Benign. 

Extremities:  Examination shows no signs of DVT.

Neurologic:  He is awake, alert, and oriented x4.  Cranial nerve examination is

intact 2-12.  Motor examination shows weakness in his left iliopsoas and 
quadriceps

with approx 3 to 4-/5 strength.  Sensory examination shows diminished sensation 
in the

left anterior thigh just short of the left knee.  Deep tendon reflexes are

hyporeflexic throughout.  There is no pathologic long-track sign.  Gait is not 
tested

for safety reasons.

Lower Back:  Examination shows left-sided paraspinal muscle spasm near the

lumbosacral junction.  He has a positive straight leg raise on the left side at 
about

45 degrees, as well as a positive left-sided Pastor maneuver.

 

LABORATORY EXAMINATION:  White blood cell count 10,300, hemoglobin is 10.6, and

platelet count is 404,000.  Serum sodium is 137, potassium is 4.7.  BUN and

creatinine are 14 and 0.7, respectively.  Urinalysis shows trace ketone.

 

CT scan of the abdomen was reviewed from 8 days earlier, which demonstrated 
from the

spine perspective an L1 superior endplate depression of approximately 10% to 15%
. 

The similar finding was re-demonstrated on a CTA of the chest done 2 days 
earlier. 

There is mild bulging in the superior cortex of L1 but no significant 
compromise of

the spinal canal. 

 

On a CTA of the chest, the patient is noted to have significant emphysematous

changes.

 

Lower extremity duplex demonstrated no DVT.

 

IMPRESSION:

1.  Superior L1 endplate osteoporotic compression fracture.

2.  Left lower extremity radicular pain.

3.  Coronary artery disease/hypertension.

4.  Rheumatoid arthritis.

5.  Hypercholesterolemia.

 

RECOMMENDATIONS:  The patient presented with a 10-day history of left-sided 
back pain

and left-sided hip, groin, and left knee pain.  He also has associated weakness 
of

the proximal left lower extremity.  His admission hip x-ray did not demonstrate 
acute

pathology such as avascular necrosis by report.  He may indeed have lumbar

radiculopathy even though there is no significant canal compromise or 
neurological

element of compression at this time.  Of course, pain management could be 
considered

if the pain persists.  I took the liberty of putting the patient on gabapentin 
300 mg

p.o. t.i.d. for the treatment of his radicular symptoms.  Hopefully, the 
medication

and physical therapy can help with symptoms adequately.  MRI is indicated to 
locate

any compressive lesions to address his source of pain.  I do not see 
indications for

neurosurgical intervention at this time as patient has not undergone much 
medical

treatment, and his condition is improving.  All questions were answered.  The 
pros and

cons of treatment approaches were discussed.

 

 

ART PRAKASH M.D.

 

[id number]

DD: 07/10/2017 14:18

DT: 07/11/2017 09:44

Job #:  [id number]

MTDD

## 2017-07-11 NOTE — PN
Progress Note (short form)





- Note


Progress Note: 


NEUROSURGERY 





Pain improving


Sitting up at bedside


PE: Geneal- occ B wheeze; Neuro- L IP/Quad 3-4- pain limited; Sensation- mild 

numbness L anterior thigh


CT scans of chest (7-7) and Abd (7-2): L1 mild superior endplate depression of 

approximately 10-15% centrally with mild superior endplate retropulsion but no 

canal compromise; osteopenia


MRI- small intrathecal 7 mm L2 lesion c/w schwannoma or meningioma; L L2-3 

foramenal HNP with L L2-3 root impingement


Not a neurosurgical candidate given relatively mild CT scan pathology of spine


Added gabapentin, can increase to 300 mg tid


Tx d/w pt


Consider pain management input if persistent pain


DVT prophylaxis


D/W Dr Whitaker

## 2017-07-11 NOTE — CONSULT
Consult


Consult Specialty:: Pain Management 


Referred by:: Dr. Suárez


Reason for Consultation:: Back pain





- History of Present Illness


Chief Complaint: Back pain


History of Present Illness: 


59 yr old with acute onset of back pain with h/o chronic back pain with 

multiple medical comorbidities, pain 7/10 radiating left lower extremity with 

numbness and tingling. medication is helping. 


no bowel/bladder incontinence.  





- History Source


History Provided By: Patient


Limitations to Obtaining History: No Limitations





- Past Medical History


CNS: No: Alzheimer's, CVA, Dementia, Migraine, Multiple Sclerosis, Peripheral 

Neuropathy, Parkinson's, Seizure, Syncope, TIA, Vertigo, Other


Cardio/Vascular: Yes: CAD, HTN, Hyperlipdemia


Pulmonary: Yes: COPD


Musculoskeletal: Yes: Chronic low back pain





- Past Surgical History


Past Surgical History: Yes: CABG, Stent





- Alcohol/Substance Use


Hx Alcohol Use: No


History of Substance Use: reports: None





- Smoking History


Smoking history: Former smoker


Have you smoked in the past 12 months: No


Aproximately how many cigarettes per day: 0


If you are a former smoker, when did you quit?: 2 years





Home Medications





- Allergies


Allergies/Adverse Reactions: 


 Allergies











Allergy/AdvReac Type Severity Reaction Status Date / Time


 


Penicillins Allergy Severe Rash Verified 07/08/17 16:34


 


seafood Allergy Severe Rash Uncoded 07/08/17 16:34














- Home Medications


Home Medications: 


Ambulatory Orders





Albuterol 2.5/Ipratropium 0.5 [Duoneb -] 1 amp NEB QIDR  amp 11/30/16 


Budesonide/Formeterol Fumarate [SYMBICORT 80/4.5mcg -] 1 puff IH BID  inhaler 11 /30/16 


Clopidogrel Bisulfate [Plavix -] 75 mg PO DAILY  tablet 11/30/16 


Furosemide [Lasix -] 40 mg PO DAILY  tablet 11/30/16 


Prednisone [Deltasone -] 10 mg PO DAILY 02/28/17 


Clindamycin [Cleocin -] 300 mg PO TID #21 capsule 07/02/17 


Oxycodone HCl/Acetaminophen [Percocet 5-325 mg Tablet] 1 tab PO Q4H #10 tablet 

MDD 4 07/02/17 


Ramipril 5 mg PO DAILY 07/02/17 


Simvastatin 40 mg PO DAILY 07/02/17 











Family Disease History





- Family Disease History


Family Disease History: Heart Disease: Grandparent, Other: Brother (Lupus)





Review of Systems





- Review of Systems


Constitutional: reports: No Symptoms


Eyes: reports: No Symptoms


HENT: reports: No Symptoms


Neck: reports: No Symptoms


Respiratory: reports: Exercise Intolerance, SOB, SOB on Exertion


Gastrointestinal: reports: No Symptoms


Genitourinary: reports: No Symptoms


Musculoskeletal: reports: Back Pain, Decreased ROM


Neurological: reports: Numbness


Endocrine: reports: No Symptoms


Psychiatric: reports: No Symptoms


Pain Intensity: 7





Physical Exam


Vital Signs: 


 Vital Signs











Temperature  97.8 F   07/11/17 15:23


 


Pulse Rate  106 H  07/11/17 15:23


 


Respiratory Rate  21   07/11/17 15:23


 


Blood Pressure  118/63   07/11/17 09:34


 


O2 Sat by Pulse Oximetry (%)  96   07/10/17 21:00











Constitutional: Yes: Well Nourished


Eyes: Yes: WNL


HENT: Yes: WNL


Neck: Yes: WNL


Respiratory: Yes: On Nasal O2


Gastrointestinal: Yes: WNL


Musculoskeletal: Yes: Back Pain, Joint Stiffness, Muscle Pain


Extremities: Yes: WNL


Edema: No


Neurological: Yes: WNL, Alert, Oriented, Cran Nerves II-XII Intact, Paresthesia


...Motor Strength: WNL


Psychiatric: Yes: WNL


Labs: 


 CBC, BMP





 07/10/17 06:10 





 07/10/17 06:10 





 Current Medications











Generic Name Dose Route Start Last Admin





  Trade Name Freq  PRN Reason Stop Dose Admin


 


Acetaminophen  325 mg 07/09/17 00:08 07/11/17 09:45





  Tylenol -  PO 07/12/17 00:07  325 mg





  Q4H PRN   Administration





  PAIN   


 


Albuterol/Ipratropium  1 amp 07/09/17 00:00 07/11/17 11:00





  Duoneb -  NEB   1 amp





  QIDR ISAIAH   Administration


 


Alprazolam  0.5 mg 07/09/17 13:43 07/10/17 11:24





  Xanax -  PO   0.5 mg





  Q8H PRN   Administration





  ANXIETY   


 


Atorvastatin Calcium  20 mg 07/09/17 22:00 07/10/17 22:43





  Lipitor -  PO   20 mg





  HS ISAIAH   Administration


 


Budesonide/Formoterol Fumarate  1 puff 07/09/17 10:00 07/11/17 09:51





  Symbicort 80/4.5mcg -  IH   1 puff





  BID ISAIAH   Administration


 


Clopidogrel Bisulfate  75 mg 07/09/17 10:00 07/11/17 09:44





  Plavix -  PO   75 mg





  DAILY ISAIAH   Administration


 


Enoxaparin Sodium  40 mg 07/10/17 10:00 07/11/17 09:44





  Lovenox -  SQ   40 mg





  DAILY ISAIAH   Administration


 


Furosemide  40 mg 07/09/17 10:00 07/11/17 09:44





  Lasix -  PO   40 mg





  DAILY ISAIAH   Administration


 


Gabapentin  100 mg 07/10/17 14:30 07/11/17 14:11





  Neurontin -  PO   100 mg





  TID ISAIAH   Administration


 


Hydromorphone HCl  1 mg 07/08/17 23:59  





  Dilaudid Injection -  IVPB   





  Q6H PRN   





  PAIN   


 


Lidocaine  1 patch 07/09/17 10:00 07/11/17 09:44





  Lidoderm Patch -  TP   1 patch





  DAILY ISAIAH   Administration


 


Miscellaneous  1 each 07/09/17 22:00 07/10/17 22:43





  Lidoderm Patch Removal  MC   1 each





  DAILY@2200 ISAIAH   Administration


 


Ondansetron HCl  4 mg 07/08/17 23:59  





  Zofran Injection  IVPB   





  Q6H PRN   





  NAUSEA   


 


Oxycodone HCl  5 mg 07/09/17 00:08 07/11/17 09:44





  Roxicodone -  PO   5 mg





  Q4H PRN   Administration





  PAIN   


 


Prednisone  10 mg 07/09/17 10:00 07/11/17 09:44





  Deltasone -  PO   10 mg





  DAILY ISAIAH   Administration


 


Ramipril  5 mg 07/09/17 10:00 07/11/17 09:44





  Altace -  PO   5 mg





  DAILY ISAIAH   Administration














Imaging





- Results


X-ray: Pending


MRI: Pending





Problem List





- Problems


(1) Lumbar disc herniation


Assessment/Plan: 


Patient will benefit with lumbar epidural but he is on plavix which needs to 

hold for 7-10 days.


he will benefit with outpatient PT. and current medication.


Thank for your kind referral 


Dr. Roy


[phone]


Code(s): M51.26 - OTHER INTERVERTEBRAL DISC DISPLACEMENT, LUMBAR REGION

## 2017-07-11 NOTE — PN
Progress Note, Physician


Chief Complaint: 


Still has pain,but less


History of Present Illness: 


MRI report noted


Case discussed in detail with Dr Mims


His symptoms are consistent with MRI findings,herniated lumbar disc


Advised epidural ing by pain management





- Current Medication List


Current Medications: 


Active Medications





Acetaminophen (Tylenol -)  325 mg PO Q4H PRN


   PRN Reason: PAIN


   Stop: 07/12/17 00:07


   Last Admin: 07/10/17 20:10 Dose:  325 mg


Albuterol/Ipratropium (Duoneb -)  1 amp NEB QIDR [facility]


   Last Admin: 07/11/17 06:12 Dose:  1 amp


Alprazolam (Xanax -)  0.5 mg PO Q8H PRN


   PRN Reason: ANXIETY


   Last Admin: 07/10/17 11:24 Dose:  0.5 mg


Atorvastatin Calcium (Lipitor -)  20 mg PO HS [facility]


   Last Admin: 07/10/17 22:43 Dose:  20 mg


Budesonide/Formoterol Fumarate (Symbicort 80/4.5mcg -)  1 puff IH BID [facility]


   Last Admin: 07/10/17 22:43 Dose:  1 puff


Clopidogrel Bisulfate (Plavix -)  75 mg PO DAILY [facility]


   Last Admin: 07/10/17 11:25 Dose:  75 mg


Enoxaparin Sodium (Lovenox -)  40 mg SQ DAILY [facility]


   Last Admin: 07/10/17 11:26 Dose:  40 mg


Furosemide (Lasix -)  40 mg PO DAILY [facility]


   Last Admin: 07/10/17 11:23 Dose:  40 mg


Gabapentin (Neurontin -)  100 mg PO TID [facility]


   Last Admin: 07/11/17 06:37 Dose:  100 mg


Hydromorphone HCl (Dilaudid Injection -)  1 mg IVPB Q6H PRN


   PRN Reason: PAIN


Lidocaine (Lidoderm Patch -)  1 patch TP DAILY [facility]


   Last Admin: 07/10/17 11:23 Dose:  1 patch


Miscellaneous (Lidoderm Patch Removal)  1 each MC DAILY@2200 [facility]


   Last Admin: 07/10/17 22:43 Dose:  1 each


Ondansetron HCl (Zofran Injection)  4 mg IVPB Q6H PRN


   PRN Reason: NAUSEA


Oxycodone HCl (Roxicodone -)  5 mg PO Q4H PRN


   PRN Reason: PAIN


   Last Admin: 07/10/17 20:11 Dose:  5 mg


Prednisone (Deltasone -)  10 mg PO DAILY [facility]


   Last Admin: 07/10/17 11:25 Dose:  10 mg


Ramipril (Altace -)  5 mg PO DAILY [facility]


   Last Admin: 07/10/17 11:25 Dose:  5 mg











- Objective


Vital Signs: 


 Vital Signs











Temperature  98.4 F   07/11/17 07:09


 


Pulse Rate  99 H  07/11/17 07:09


 


Respiratory Rate  20   07/11/17 07:09


 


Blood Pressure  128/74   07/11/17 07:09


 


O2 Sat by Pulse Oximetry (%)  96   07/10/17 21:00











Constitutional: Yes: Mild Distress


Eyes: Yes: WNL


HENT: Yes: WNL


Neck: Yes: WNL


Cardiovascular: Yes: WNL


Respiratory: Yes: On BiPap, On Nasal O2, Poor Air Entry


Gastrointestinal: Yes: Normal Bowel Sounds


...Rectal Exam: Yes: Deferred


Genitourinary: Yes: WNL


Edema: No


Labs: 


 CBC, BMP





 07/10/17 06:10 





 07/10/17 06:10 











Assessment/Plan


Consult Dr Roy pain management

## 2017-07-12 NOTE — PN
Progress Note (short form)





- Note


Progress Note: 


NEUROSURGERY 





Pain 7-8/10, worse in AM


Standing up at bedside


PE: Geneal- Lungs clear B; Neuro- L IP/Quad 3-4- pain limited; Sensation- mild 

numbness L anterior thigh


CT scans of chest (7-7) and Abd (7-2): L1 mild superior endplate depression of 

approximately 10-15% centrally with mild superior endplate retropulsion but no 

canal compromise; osteopenia


MRI- small intrathecal 7 mm L2 lesion c/w benign schwannoma or meningioma; L L2-

3 foramenal HNP with L L2-3 root impingement; L2 fx is chronic


Not a neurosurgical candidate as patient has not tried much medical/

conservative tx


If worsening neurological condition or pain despite conservative tx could 

consider surgical intervention


Added gabapentin, can increase to 300 mg tid


LS MRI with albert baseline to assess intrathecal lesion needed prior any surgical 

intervention


Pain management input noted 


Cardiology input for holding ASA/plavix potentially per pain management


DVT prophylaxis


D/W Dr Whitaker yesterday

## 2017-07-12 NOTE — DS
Physical Examination


Vital Signs: 


 Vital Signs











Temperature  97.2 F L  07/12/17 07:26


 


Pulse Rate  102 H  07/12/17 07:26


 


Respiratory Rate  20   07/12/17 07:26


 


Blood Pressure  145/73   07/12/17 07:26


 


O2 Sat by Pulse Oximetry (%)  96   07/12/17 07:26











Findings/Remarks: 


Herniated disc


Constitutional: Yes: No Distress


Eyes: Yes: WNL


HENT: Yes: WNL


Neck: Yes: WNL


Cardiovascular: Yes: WNL


Respiratory: Yes: Poor Air Entry


Gastrointestinal: Yes: WNL


...Rectal Exam: Yes: Deferred


Renal/: Yes: WNL


Musculoskeletal: Yes: WNL


Extremities: Yes: WNL


Edema: No


Neurological: Yes: WNL


...Motor Strength: WNL


Psychiatric: Yes: Alert


Labs: 


 CBC, BMP





 07/10/17 06:10 





 07/10/17 06:10 











Discharge Summary


Reason For Visit: COMPRESSION FRACTURE OF L1 LUMBER VERTEB


Current Active Problems





Compression fracture of L1 lumbar vertebra (Acute) 


Inguinal hernia (Acute) 


Intractable pain (Acute) 


Left groin pain (Acute) 


Lumbar disc herniation (Acute) 


Radiculopathy (Acute) 


Sinus tachycardia (Acute) 











- Instructions


Referrals: 


Keanu Whitaker MD [Primary Care Provider] - 





- Home Medications


Comprehensive Discharge Medication List: 


Ambulatory Orders





Albuterol 2.5/Ipratropium 0.5 [Duoneb -] 1 amp NEB QIDR  amp 11/30/16 


Budesonide/Formeterol Fumarate [SYMBICORT 80/4.5mcg -] 1 puff IH BID  inhaler 11 /30/16 


Clopidogrel Bisulfate [Plavix -] 75 mg PO DAILY  tablet 11/30/16 


Furosemide [Lasix -] 40 mg PO DAILY  tablet 11/30/16 


Prednisone [Deltasone -] 10 mg PO DAILY 02/28/17 


Clindamycin [Cleocin -] 300 mg PO TID #21 capsule 07/02/17 


Oxycodone HCl/Acetaminophen [Percocet 5-325 mg Tablet] 1 tab PO Q4H #10 tablet 

MDD 4 07/02/17 


Ramipril 5 mg PO DAILY 07/02/17 


Simvastatin 40 mg PO DAILY 07/02/17